# Patient Record
(demographics unavailable — no encounter records)

---

## 2025-03-06 NOTE — HISTORY OF PRESENT ILLNESS
[FreeTextEntry1] : JINA  is a 41 year old female seen in the  office  Patient is present with c/o ingrown toenail left great toe. Patient c/o pain when she puts pressure on it. Denies sign of infection.  She wants it to be cut it on the edges to give a temporally relief. Patient further c/o lump on her right foot lateral side, she states she often wakes up with cramps on that foot, denies injury to the foot

## 2025-03-06 NOTE — ASSESSMENT
[FreeTextEntry1] : Discussed etiology and treatment options for the chronic ankle sprain and instability which she states is not affecting her life currently.   She will start stretching the calf muscle as directed. The left great toe was prepped and medial nail border and skin fold were debrided and curettaged.  Resected out. Topical antibiotic and Band-Aid was applied. She is aware that if this should become recurrent then a permanent partial nail avulsion can be performed  She will follow-up as needed

## 2025-03-06 NOTE — PHYSICAL EXAM
[Ankle Swelling (On Exam)] : not present [Varicose Veins Of Lower Extremities] : not present [] : not present [2+] : left foot dorsalis pedis 2+ [de-identified] : Increased ankle inversion right side compared to left side.  There is increased prominence of the fifth metatarsal base which she relays is related to her previous fracture [FreeTextEntry1] : Incurvated painful medial border of the left great toenail with local erythema.  No acute signs of infection [Sensation] : the sensory exam was normal to light touch and pinprick [Vibration Dec.] : normal vibratory sensation at the level of the toes [Position Sense Dec.] : normal position sense at the level of the toes